# Patient Record
Sex: MALE | Race: WHITE | URBAN - METROPOLITAN AREA
[De-identification: names, ages, dates, MRNs, and addresses within clinical notes are randomized per-mention and may not be internally consistent; named-entity substitution may affect disease eponyms.]

---

## 2017-09-14 ENCOUNTER — CLINICAL SUPPORT (OUTPATIENT)
Dept: CARDIOLOGY CLINIC | Age: 47
End: 2017-09-14

## 2017-09-14 DIAGNOSIS — Z02.89 EXAMINATION, PHYSICAL, EMPLOYEE: Primary | ICD-10-CM

## 2017-09-14 NOTE — PROGRESS NOTES
Explained procedure to patient, monitoring EKG/HR/BP,  walking on treadmill, and risks/discomforts. All concerns and questions addressed. Consent obtained. See scanned report. Bartolo HERRERA ordered and Dr. Malachi Baron read study. ID verified per protocol. Pt  reported no symptoms at completion of protocol.